# Patient Record
(demographics unavailable — no encounter records)

---

## 2025-05-13 NOTE — HISTORY OF PRESENT ILLNESS
[Patient reported PAP Smear was normal] : Patient reported PAP Smear was normal [FreeTextEntry1] : 33 y/o P1 here for annual and to establish care.  New patient to me and Vidhya.  H/o  x 1 to girl 17.  LMP 25, regular every month.  Has been trying to conceive with  and no pregnancy. First pregnancy she did not have any issues, different father.   No family h/o GYN malignancies.  Both parents have HTN, DM, and HLD.  [PapSmeardate] : 01/23

## 2025-05-13 NOTE — PHYSICAL EXAM
[MA] : MA [Appropriately responsive] : appropriately responsive [Alert] : alert [No Acute Distress] : no acute distress [Soft] : soft [Non-tender] : non-tender [Non-distended] : non-distended [Oriented x3] : oriented x3 [FreeTextEntry2] : Tami [Examination Of The Breasts] : a normal appearance [No Masses] : no breast masses were palpable [Labia Majora] : normal [Labia Minora] : normal [Normal] : normal [Uterine Adnexae] : normal